# Patient Record
Sex: FEMALE | Race: WHITE | NOT HISPANIC OR LATINO | URBAN - METROPOLITAN AREA
[De-identification: names, ages, dates, MRNs, and addresses within clinical notes are randomized per-mention and may not be internally consistent; named-entity substitution may affect disease eponyms.]

---

## 2024-06-20 ENCOUNTER — EMERGENCY (EMERGENCY)
Facility: HOSPITAL | Age: 24
LOS: 0 days | Discharge: HOME | End: 2024-06-20
Attending: EMERGENCY MEDICINE
Payer: COMMERCIAL

## 2024-06-20 VITALS
TEMPERATURE: 98 F | RESPIRATION RATE: 18 BRPM | WEIGHT: 142.64 LBS | SYSTOLIC BLOOD PRESSURE: 106 MMHG | OXYGEN SATURATION: 100 % | DIASTOLIC BLOOD PRESSURE: 71 MMHG | HEART RATE: 63 BPM

## 2024-06-20 DIAGNOSIS — R10.32 LEFT LOWER QUADRANT PAIN: ICD-10-CM

## 2024-06-20 DIAGNOSIS — R11.0 NAUSEA: ICD-10-CM

## 2024-06-20 DIAGNOSIS — Z87.440 PERSONAL HISTORY OF URINARY (TRACT) INFECTIONS: ICD-10-CM

## 2024-06-20 DIAGNOSIS — N83.202 UNSPECIFIED OVARIAN CYST, LEFT SIDE: ICD-10-CM

## 2024-06-20 LAB
ABO RH CONFIRMATION: SIGNIFICANT CHANGE UP
ALBUMIN SERPL ELPH-MCNC: 4.7 G/DL — SIGNIFICANT CHANGE UP (ref 3.5–5.2)
ALP SERPL-CCNC: 54 U/L — SIGNIFICANT CHANGE UP (ref 30–115)
ALT FLD-CCNC: 11 U/L — SIGNIFICANT CHANGE UP (ref 0–41)
ANION GAP SERPL CALC-SCNC: 6 MMOL/L — LOW (ref 7–14)
APPEARANCE UR: CLEAR — SIGNIFICANT CHANGE UP
AST SERPL-CCNC: 16 U/L — SIGNIFICANT CHANGE UP (ref 0–41)
BASOPHILS # BLD AUTO: 0.06 K/UL — SIGNIFICANT CHANGE UP (ref 0–0.2)
BASOPHILS NFR BLD AUTO: 1 % — SIGNIFICANT CHANGE UP (ref 0–1)
BILIRUB SERPL-MCNC: 0.4 MG/DL — SIGNIFICANT CHANGE UP (ref 0.2–1.2)
BILIRUB UR-MCNC: NEGATIVE — SIGNIFICANT CHANGE UP
BUN SERPL-MCNC: 18 MG/DL — SIGNIFICANT CHANGE UP (ref 10–20)
CALCIUM SERPL-MCNC: 9 MG/DL — SIGNIFICANT CHANGE UP (ref 8.4–10.5)
CHLORIDE SERPL-SCNC: 101 MMOL/L — SIGNIFICANT CHANGE UP (ref 98–110)
CO2 SERPL-SCNC: 29 MMOL/L — SIGNIFICANT CHANGE UP (ref 17–32)
COLOR SPEC: YELLOW — SIGNIFICANT CHANGE UP
CREAT SERPL-MCNC: 0.8 MG/DL — SIGNIFICANT CHANGE UP (ref 0.7–1.5)
DIFF PNL FLD: NEGATIVE — SIGNIFICANT CHANGE UP
EGFR: 107 ML/MIN/1.73M2 — SIGNIFICANT CHANGE UP
EOSINOPHIL # BLD AUTO: 0.11 K/UL — SIGNIFICANT CHANGE UP (ref 0–0.7)
EOSINOPHIL NFR BLD AUTO: 1.8 % — SIGNIFICANT CHANGE UP (ref 0–8)
GLUCOSE SERPL-MCNC: 80 MG/DL — SIGNIFICANT CHANGE UP (ref 70–99)
GLUCOSE UR QL: NEGATIVE MG/DL — SIGNIFICANT CHANGE UP
HCG SERPL QL: NEGATIVE — SIGNIFICANT CHANGE UP
HCT VFR BLD CALC: 37.5 % — SIGNIFICANT CHANGE UP (ref 37–47)
HGB BLD-MCNC: 12.7 G/DL — SIGNIFICANT CHANGE UP (ref 12–16)
IMM GRANULOCYTES NFR BLD AUTO: 0 % — LOW (ref 0.1–0.3)
KETONES UR-MCNC: NEGATIVE MG/DL — SIGNIFICANT CHANGE UP
LEUKOCYTE ESTERASE UR-ACNC: NEGATIVE — SIGNIFICANT CHANGE UP
LYMPHOCYTES # BLD AUTO: 2.09 K/UL — SIGNIFICANT CHANGE UP (ref 1.2–3.4)
LYMPHOCYTES # BLD AUTO: 33.4 % — SIGNIFICANT CHANGE UP (ref 20.5–51.1)
MCHC RBC-ENTMCNC: 31.1 PG — HIGH (ref 27–31)
MCHC RBC-ENTMCNC: 33.9 G/DL — SIGNIFICANT CHANGE UP (ref 32–37)
MCV RBC AUTO: 91.7 FL — SIGNIFICANT CHANGE UP (ref 81–99)
MONOCYTES # BLD AUTO: 0.62 K/UL — HIGH (ref 0.1–0.6)
MONOCYTES NFR BLD AUTO: 9.9 % — HIGH (ref 1.7–9.3)
NEUTROPHILS # BLD AUTO: 3.38 K/UL — SIGNIFICANT CHANGE UP (ref 1.4–6.5)
NEUTROPHILS NFR BLD AUTO: 53.9 % — SIGNIFICANT CHANGE UP (ref 42.2–75.2)
NITRITE UR-MCNC: NEGATIVE — SIGNIFICANT CHANGE UP
NRBC # BLD: 0 /100 WBCS — SIGNIFICANT CHANGE UP (ref 0–0)
PH UR: 6 — SIGNIFICANT CHANGE UP (ref 5–8)
PLATELET # BLD AUTO: 179 K/UL — SIGNIFICANT CHANGE UP (ref 130–400)
PMV BLD: 10.7 FL — HIGH (ref 7.4–10.4)
POTASSIUM SERPL-MCNC: 3.9 MMOL/L — SIGNIFICANT CHANGE UP (ref 3.5–5)
POTASSIUM SERPL-SCNC: 3.9 MMOL/L — SIGNIFICANT CHANGE UP (ref 3.5–5)
PROT SERPL-MCNC: 7 G/DL — SIGNIFICANT CHANGE UP (ref 6–8)
PROT UR-MCNC: NEGATIVE MG/DL — SIGNIFICANT CHANGE UP
RBC # BLD: 4.09 M/UL — LOW (ref 4.2–5.4)
RBC # FLD: 12.8 % — SIGNIFICANT CHANGE UP (ref 11.5–14.5)
SODIUM SERPL-SCNC: 136 MMOL/L — SIGNIFICANT CHANGE UP (ref 135–146)
SP GR SPEC: 1.01 — SIGNIFICANT CHANGE UP (ref 1–1.03)
UROBILINOGEN FLD QL: 0.2 MG/DL — SIGNIFICANT CHANGE UP (ref 0.2–1)
WBC # BLD: 6.26 K/UL — SIGNIFICANT CHANGE UP (ref 4.8–10.8)
WBC # FLD AUTO: 6.26 K/UL — SIGNIFICANT CHANGE UP (ref 4.8–10.8)

## 2024-06-20 PROCEDURE — 99283 EMERGENCY DEPT VISIT LOW MDM: CPT

## 2024-06-20 PROCEDURE — 86850 RBC ANTIBODY SCREEN: CPT

## 2024-06-20 PROCEDURE — 86901 BLOOD TYPING SEROLOGIC RH(D): CPT

## 2024-06-20 PROCEDURE — 99285 EMERGENCY DEPT VISIT HI MDM: CPT

## 2024-06-20 PROCEDURE — 87661 TRICHOMONAS VAGINALIS AMPLIF: CPT

## 2024-06-20 PROCEDURE — 36415 COLL VENOUS BLD VENIPUNCTURE: CPT

## 2024-06-20 PROCEDURE — 84703 CHORIONIC GONADOTROPIN ASSAY: CPT

## 2024-06-20 PROCEDURE — 80053 COMPREHEN METABOLIC PANEL: CPT

## 2024-06-20 PROCEDURE — 87491 CHLMYD TRACH DNA AMP PROBE: CPT

## 2024-06-20 PROCEDURE — 99284 EMERGENCY DEPT VISIT MOD MDM: CPT | Mod: 25

## 2024-06-20 PROCEDURE — 76830 TRANSVAGINAL US NON-OB: CPT | Mod: 26

## 2024-06-20 PROCEDURE — 81003 URINALYSIS AUTO W/O SCOPE: CPT

## 2024-06-20 PROCEDURE — 87591 N.GONORRHOEAE DNA AMP PROB: CPT

## 2024-06-20 PROCEDURE — 36000 PLACE NEEDLE IN VEIN: CPT

## 2024-06-20 PROCEDURE — 85025 COMPLETE CBC W/AUTO DIFF WBC: CPT

## 2024-06-20 PROCEDURE — 86900 BLOOD TYPING SEROLOGIC ABO: CPT

## 2024-06-20 PROCEDURE — 76830 TRANSVAGINAL US NON-OB: CPT

## 2024-06-20 RX ORDER — ACETAMINOPHEN 500 MG
650 TABLET ORAL ONCE
Refills: 0 | Status: COMPLETED | OUTPATIENT
Start: 2024-06-20 | End: 2024-06-20

## 2024-06-20 RX ADMIN — Medication 650 MILLIGRAM(S): at 20:50

## 2024-06-20 NOTE — ED PROVIDER NOTE - ATTENDING APP SHARED VISIT CONTRIBUTION OF CARE
23-year-old female presents to the ED with severe lower abdominal pain that occurs a few days prior to her period.  Patient has an IUD placed that was supposed to help her painful.  LMP 5/20 and reports that last for about 11 days.  Reports bloody discharge that started today.  Patient sexually active.  Denies any history of STIs.  Went to PMD and had blood work done recently but did not get results. Vs reviewed  PE well appearing nad comfortable llq tenderness no cva tenderness mild suprapubic tenderness A: Pelvic pain P: Labs, UA, Upreg, US pelvis.

## 2024-06-20 NOTE — ED ADULT NURSE NOTE - CHIEF COMPLAINT QUOTE
Presented to ED c/o worsening abdominal cramps since having IUD placed in Jan-Feb. C/o longer and more painful menstrual periods not relieved with OTC meds. Denies f/n/v/d.

## 2024-06-20 NOTE — ED ADULT TRIAGE NOTE - CHIEF COMPLAINT QUOTE
Presented to ED c/o worsening abdominal cramps since having IUD placed in Jan-Feb. C/o longer and more painful menstrual periods not relieved with OTC meds. Denies f/n/v/d. none

## 2024-06-20 NOTE — CONSULT NOTE ADULT - SUBJECTIVE AND OBJECTIVE BOX
PGY2    Chief Complaint: abdominal cramping    HPI: 22 y/o G0, LMP , Mirena IUD in place presents with intermittent abdominal cramping since Mirena IUD placement 24. Mirena IUD placed my primary GYN Dr. Nix for contraception and heavy menses. Menses prior to IUD placement q30 days lasting 7 days with moderate cramping. Since placement, 10 days prior to menses reports intense intermittent lower abdominal cramping. Endorses some nausea, no vomiting. Denies dyspareunia. Pt reports went to Dr. Nix and was prescribed ibuprofen, which sometimes alleviates cramps. Yesterday had worst episode of cramping, has since resolved but prompted ED visit today. States next menses due . No previous h/o ovarian cysts, STIs. No fevers, chills, SOB, chest pain, diarrhea. Recent UTI last week completed course of macrobid, dysuria improved.     Ob/Gyn History:  G0                 LMP -                    Cycle Length - 10 days, every 30 days    Denies the following: constitutional symptoms, visual symptoms, cardiovascular symptoms, respiratory symptoms, GI symptoms, musculoskeletal symptoms, skin symptoms, neurologic symptoms, hematologic symptoms, allergic symptoms, psychiatric symptoms  Except any pertinent positives listed.     Home Medications: None  Allergies: No Known Allergies    PAST MEDICAL & SURGICAL HISTORY: Denies    SOCIAL HISTORY: Denies cigarette use, alcohol use, or illicit drug use    Vital Signs Last 24 Hrs  T(F): 98 (2024 17:14), Max: 98 (2024 17:14)  HR: 63 (2024 17:14) (63 - 63)  BP: 106/71 (2024 17:14) (106/71 - 106/71)  RR: 18 (2024 17:14) (18 - 18)    General Appearance - AAOx3, NAD  Abdomen - Soft, nontender, nondistended, no rebound, no rigidity, no guarding    GYN/Pelvis:  External genitalia: Normal  Vagina: no bleeding, no abnormal discharge  Cervix: Mirena IUD strings visualized. No bleeding from os  Uterus: Nontender  Adnexa: Nontender    LABS:                        12.7   6.26  )-----------( 179      ( 2024 17:51 )             37.5       06-20    136  |  101  |  18  ----------------------------<  80  3.9   |  29  |  0.8    Ca    9.0      2024 17:51    TPro  7.0  /  Alb  4.7  /  TBili  0.4  /  DBili  x   /  AST  16  /  ALT  11  /  AlkPhos  54  06-20      Urinalysis Basic - ( 2024 17:51 )    Color: Yellow / Appearance: Clear / S.008 / pH: x  Gluc: 80 mg/dL / Ketone: Negative mg/dL  / Bili: Negative / Urobili: 0.2 mg/dL   Blood: x / Protein: Negative mg/dL / Nitrite: Negative   Leuk Esterase: Negative / RBC: x / WBC x   Sq Epi: x / Non Sq Epi: x / Bacteria: x    RADIOLOGY & ADDITIONAL STUDIES:  < from: US Transvaginal (24 @ 19:22) >    ACC: [id number] EXAM:  US TRANSVAGINAL   ORDERED BY: JOHN MOSQUERA     PROCEDURE DATE:  2024          INTERPRETATION:  CLINICAL HISTORY / REASON FOR EXAM: Abdominal cramps    COMPARISON: None    LMP: 2024    TECHNIQUE: Endovaginal and transabdominal pelvic sonogram. Color and   Spectral Doppler was performed.    FINDINGS:    Uterus: 7.7 x 2.8 x 4.4 cm. Within normal limits.  Endometrium: Intrauterine device within the endometrial canal.    Right ovary: 4.5 x 2.3 x 3.5 cm. Volume 19 mL. Within normal limits.   Normal arterial and venous waveforms.  Left ovary: 5.7 x 3.4 x 5.7 cm. Volume 58 mL. Cyst measuring 5.7 x 3.4 x   5.7 cm with trace paraovarian fluid. Normal arterial and venous waveforms.      IMPRESSION:    Left ovarian cyst measuring 5.7 x 3.4 x 5.7 cm. Given size, intermittent   ovarian torsion/detorsion remains in the differential.    --- End of Report ---    ADRIAN VASQUEZ MD; Attending Radiologist  This document has been electronically signed. 2024  8:26PM    < end of copied text >

## 2024-06-20 NOTE — ED PROVIDER NOTE - CLINICAL SUMMARY MEDICAL DECISION MAKING FREE TEXT BOX
23-year-old female with severe lower abdominal pain which improved.  Patient also an IUD placed.  Patient also had LMP 5/20 that lasted about 11 days.  Patient also noted to have some bloody discharge today.  Pelvic ultrasound revealed left ovarian cyst.  Cyst was large, but no signs of torsion.  Patient evaluated by GYN and cleared for outpatient follow-up with GYN.  Patient comfortable with plan.

## 2024-06-20 NOTE — ED PROVIDER NOTE - PROVIDER TOKENS
FREE:[LAST:[follow up with your pediatrician],PHONE:[(   )    -],FAX:[(   )    -],FOLLOWUP:[1-3 Days]],FREE:[LAST:[FOLLOW UP WITH YOUR OBGYN],PHONE:[(   )    -],FAX:[(   )    -],FOLLOWUP:[1-3 Days]]

## 2024-06-20 NOTE — ED PROVIDER NOTE - PATIENT PORTAL LINK FT
You can access the FollowMyHealth Patient Portal offered by [facility] by registering at the following website: [email or web address]. By joining [employer]’s FollowMyHealth portal, you will also be able to view your health information using other applications (apps) compatible with our system. yes

## 2024-06-20 NOTE — CONSULT NOTE ADULT - ASSESSMENT
22 y/o G0, LMP 5/20, Mirena IUD in place, with intermittent, cyclical abdominal cramping since IUD placement 2/5, TVUS with 5.7cm right simple ovarian cyst, currently asymptomatic and hemodynamically stable, low suspicion for torsion.     - TVUS findings reviewed  - recommend f/u with GYN, Dr. Nix, in 1 week for repeat sonogram to evaluate right ovarian cyst  - recommend naproxen 500mg BID for pain, can alternate with tylenol  - strict ovarian torsion precautions discussed  - dispo per ED    Discussed with Dr. Nichols.

## 2024-06-20 NOTE — ED PROVIDER NOTE - OBJECTIVE STATEMENT
patient is a 24yo female no PMH coming to ED for abdominal cramping. pt reports more intense abdominal cramping for 10 days prior to her period, that has been going on for the last few months. pt has seen OBGYN and had neg US, had IUD placed in January, notes symptoms seem to be worse since getting IUD. notes white vaginal discharge with red specks today. of note pt diagnosed with UTI x2 weeks ago and was on abx, symptoms (urinary frequency) resolved. denies fever, n,v,d,c, urinary symptoms, chest pain.

## 2024-06-20 NOTE — ED PROVIDER NOTE - PHYSICAL EXAMINATION
CONST: Well appearing in NAD  CARD: Normal S1 S2; Normal rate and rhythm  RESP: Equal BS B/L, No wheezes, rhonchi or rales. No distress  GI: Soft, non-distended; TTP suprapubic and L lower abdomen   MS: Normal ROM in all extremities. No midline spinal tenderness.  SKIN: Warm, dry, no acute rashes. Good turgor  NEURO: A&Ox3, No focal deficits. Strength 5/5 with no sensory deficits  : normal  exam, no cervical motion tenderness, scant bloody vaginal discharge

## 2024-06-20 NOTE — ED PROVIDER NOTE - NSFOLLOWUPINSTRUCTIONS_ED_ALL_ED_FT
FOLLOW UP WITH OBGYN IN 1 WEEK FOR REPEAT ULTRASOUND  FOLLOW UP WITH YOUR PRIMARY DOCTOR  RETURN TO ED FOR NEW OR WORSENING SYMPTOMS    Ovarian Cyst    WHAT YOU NEED TO KNOW:    What is an ovarian cyst? An ovarian cyst is a fluid-filled sac that grows in or on an ovary. You have 2 ovaries, 1 on each side of your uterus. They are small, about the size and shape of an almond. Ovarian cysts are common in women who have regular monthly cycles. During your monthly cycle, eggs are released from the ovaries. The cyst usually contains fluid but may sometimes have blood or tissue in it. Most ovarian cysts are harmless and go away without treatment in a few months. Some cysts can grow large, cause pain, or break open.  Female Reproductive System    What causes an ovarian cyst?    Problems with your hormones    Medicines that help you ovulate    Endometriosis    Pregnancy    A severe infection in your pelvis  What are the signs and symptoms of an ovarian cyst? You may have pressure, bloating or swelling in your lower abdomen on the side of the cyst. You may also have dull or sharp pain that may come and go. The following are less common signs and symptoms:    A dull ache in your lower back and thighs    Unusual vaginal bleeding    Weight gain you did not expect or plan    Pain during your monthly cycle    Tenderness in your breasts    Trouble completely emptying your bowels or bladder    The need to urinate often    Pain during sex  How is an ovarian cyst diagnosed?    Blood tests may show what type of cyst you have and if you need treatment.    A pelvic exam may help your healthcare provider feel an ovarian cyst.    A pelvic ultrasound may show a cyst on your ovary. An ultrasound wand uses sound waves to show pictures on a monitor. The wand is inserted into your vagina and guided up toward your uterus.  How is an ovarian cyst treated? Treatment will depend on your age, symptoms, and the kind of cyst you have. You may need any of the following:    Watchful waiting may be recommended. This means the cyst is not treated right away. You will need to watch for any signs or symptoms that the cyst is growing. You may need to return for one or more ultrasounds after a certain period of time. These will show if your cyst has changed in size.    Medicines:  Birth control pills may help control your monthly cycle, prevent cysts, or cause them to shrink.    Acetaminophen decreases pain and fever. It is available without a doctor's order. Ask how much to take and how often to take it. Follow directions. Read the labels of all other medicines you are using to see if they also contain acetaminophen, or ask your doctor or pharmacist. Acetaminophen can cause liver damage if not taken correctly.    NSAIDs, such as ibuprofen, help decrease swelling, pain, and fever. This medicine is available with or without a doctor's order. NSAIDs can cause stomach bleeding or kidney problems in certain people. If you take blood thinner medicine, always ask your healthcare provider if NSAIDs are safe for you. Always read the medicine label and follow directions.    Prescription pain medicine may be given. Ask your healthcare provider how to take this medicine safely. Some prescription pain medicines contain acetaminophen. Do not take other medicines that contain acetaminophen without talking to your healthcare provider. Too much acetaminophen may cause liver damage. Prescription pain medicine may cause constipation. Ask your healthcare provider how to prevent or treat constipation.    Surgery may be needed to remove the ovarian cyst.  How can I manage ovarian cysts? You can manage a current cyst and help healthcare providers find future cysts early.    Apply heat to decrease pain and cramping from a cyst. Sit in a warm bath, or place a heating pad (turned on low) on your abdomen. Do this for 15 to 20 minutes every hour for comfort.    Get regular pelvic exams or Pap smears. This will help providers find any new ovarian cysts. Tell your healthcare provider about any unusual changes in your monthly cycle.  Call your local emergency number (911 in the [place]) if:    You have severe pain with fever and vomiting.    You have sudden, severe abdominal pain.    You are too weak, faint, or dizzy to stand up.    You are breathing very quickly.  When should I call my doctor?    Your periods are early, late, or more painful than usual.    You have questions or concerns about your condition or care.

## 2024-06-20 NOTE — ED PROVIDER NOTE - PROGRESS NOTE DETAILS
Rachel: Pt signed out to Dr. Bland pending US read and GYN. Baldo: Acknowledged from Dr. Ramos, pending ultrasound result. obgyn consulted, pt cleared for outpatient follow up and repeat US

## 2024-06-20 NOTE — CONSULT NOTE ADULT - ATTENDING COMMENTS
Patient with lower abdominal cramping related to periods with incidental finding of ovarian cyst, low suspicion for torsion at this time. IUD appears to be in place per US and exam findings. Pain and torsion precautions given and all questions answered, advised to f/u with primary GYN for re-imaging of cyst.

## 2024-06-20 NOTE — ED PROVIDER NOTE - CARE PROVIDER_API CALL
follow up with your pediatrician,   Phone: (   )    -  Fax: (   )    -  Follow Up Time: 1-3 Days    FOLLOW UP WITH YOUR OBGYN,   Phone: (   )    -  Fax: (   )    -  Follow Up Time: 1-3 Days   Complex Repair And Xenograft Text: The defect edges were debeveled with a #15 scalpel blade.  The primary defect was closed partially with a complex linear closure.  Given the location of the defect, shape of the defect and the proximity to free margins a xenograft was deemed most appropriate to repair the remaining defect.  The graft was trimmed to fit the size of the remaining defect.  The graft was then placed in the primary defect, oriented appropriately, and sutured into place.

## 2024-06-21 LAB
C TRACH RRNA SPEC QL NAA+PROBE: SIGNIFICANT CHANGE UP
N GONORRHOEA RRNA SPEC QL NAA+PROBE: SIGNIFICANT CHANGE UP
SPECIMEN SOURCE: SIGNIFICANT CHANGE UP
SPECIMEN SOURCE: SIGNIFICANT CHANGE UP

## 2024-06-22 LAB — T VAGINALIS RRNA SPEC QL NAA+PROBE: SIGNIFICANT CHANGE UP
